# Patient Record
(demographics unavailable — no encounter records)

---

## 2018-10-24 NOTE — MM
Reason for exam: screening  (asymptomatic).

Last mammogram was performed 2 years ago.



History:

Patient is postmenopausal.

Family history of breast cancer in paternal aunt.



Physical Findings:

A clinical breast exam by your physician is recommended on an annual basis and 

results should be correlated with mammographic findings.



MG Screening Mammo w CAD

Bilateral CC and MLO view(s) were taken.

Prior study comparison: October 10, 2016, bilateral MG screening mammo w CAD.  

March 27, 2015, mammogram, performed at [place].

The breast tissue is heterogeneously dense. This may lower the sensitivity of 

mammography.  No suspicious abnormality.  No significant changes when compared 

with prior studies.





ASSESSMENT: Benign, BI-RAD 2



RECOMMENDATION:

Routine screening mammogram of both breasts in 1 year.